# Patient Record
Sex: MALE | Race: WHITE | URBAN - METROPOLITAN AREA
[De-identification: names, ages, dates, MRNs, and addresses within clinical notes are randomized per-mention and may not be internally consistent; named-entity substitution may affect disease eponyms.]

---

## 2018-10-08 ENCOUNTER — OFFICE VISIT (OUTPATIENT)
Dept: ORTHOPEDIC SURGERY | Age: 23
End: 2018-10-08
Payer: COMMERCIAL

## 2018-10-08 VITALS
HEART RATE: 67 BPM | HEIGHT: 70 IN | WEIGHT: 175 LBS | DIASTOLIC BLOOD PRESSURE: 69 MMHG | SYSTOLIC BLOOD PRESSURE: 111 MMHG | BODY MASS INDEX: 25.05 KG/M2

## 2018-10-08 DIAGNOSIS — M25.852 FEMOROACETABULAR IMPINGEMENT OF LEFT HIP: ICD-10-CM

## 2018-10-08 DIAGNOSIS — M25.551 BILATERAL HIP PAIN: Primary | ICD-10-CM

## 2018-10-08 DIAGNOSIS — M25.851 FEMOROACETABULAR IMPINGEMENT OF RIGHT HIP: ICD-10-CM

## 2018-10-08 DIAGNOSIS — M25.552 BILATERAL HIP PAIN: Primary | ICD-10-CM

## 2018-10-08 PROCEDURE — G8419 CALC BMI OUT NRM PARAM NOF/U: HCPCS | Performed by: ORTHOPAEDIC SURGERY

## 2018-10-08 PROCEDURE — G8484 FLU IMMUNIZE NO ADMIN: HCPCS | Performed by: ORTHOPAEDIC SURGERY

## 2018-10-08 PROCEDURE — G8427 DOCREV CUR MEDS BY ELIG CLIN: HCPCS | Performed by: ORTHOPAEDIC SURGERY

## 2018-10-08 PROCEDURE — 99243 OFF/OP CNSLTJ NEW/EST LOW 30: CPT | Performed by: ORTHOPAEDIC SURGERY

## 2018-10-08 NOTE — PROGRESS NOTES
intact. [] Edema:  [x] none  [] mild  [] moderate  [] severe         RIGHT HIP ORTHOPAEDIC  EXAM:  Inspection:  [x] Skin intact without abrasion, lacerations or rashes  [x] Leg lengths equal  [] Ecchymosis:  [x] none  [] mild  [] moderate  [] severe   [] Atrophy:  [x] none  [] mild  [] moderate  [] severe      Range of Motion:  [x] No flexion contracture         [] Deferred: acute injury/post-surgery/pain  [] Flexion contracture     Forward flexion: 110  Supine Internal rotation: 20 positive pain  Supine External rotation: 65  Abduction: 50  Adduction: 30      Palpation:   Nontender    Provocative Tests:  [] Negative  Positive Tests:  [] Log Roll   [] Kamryn Test: ITB Tightness   [x] FADIR Anterior impingement:    [] Posterior Impingement    [] Shuck test for insufficient suction seal   [] Dial test for capsular insufficiency:    [] Resisted adduction for athletic pubalgia   [] Resisted curl up for athletic pubalgia     Motor Function:  [x] No gross motor weakness of hip [x] No gross motor weakness of knee  [x] No gross motor weakness of ankle    [x] No gross motor weakness of great toe    [] Motor strength:   [x] Hip Flex [x] 5/5 [] 4/5 [] 3/5 [] 2/5 [] 1/5 [] 0/5   [x] Hip ABductors [x] 5/5 [] 4/5 [] 3/5 [] 2/5 [] 1/5 [] 0/5   [x] Hip ADductors [x] 5/5 [] 4/5 [] 3/5 [] 2/5 [] 1/5 [] 0/5     Neurologic:  [x] Sensation to light touch intact  [x] Coordination / proprioception intact    Circulation:  [x] The limb is warm and well perfused. [x] Capillary refill is intact. 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 No fractures of any kind or any osteoblastic lesions. Joint space is reasonably well preserved.       X Ray Measure  Right Hip Left Hip    Center Edge Angle (CEA) 42° 45°    Alpha Angle (degrees for CAM) 71°  77°    Tonnis Grade (0-4) 0 0   Joint Space (mm) 4.2 mm  3.9 mm       Other Imaging: An MRI of the left hip was reviewed in the office. This MRI demonstrates the patient does have femoral acetabular impingement as well as a osseous stress reaction on the anterior rim of the acetabulum. Labrum was not very well visualized. 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 This is a condition in which there is an incongruency between the osseous structures surrounding the hip and can lead to persistent pain, labral injury and cartilage injury in some cases. Pain is anterior and has been present for several years off and on. Thus far the following treatments have been tried: Several years of activity modification as well as some strengthening exercises. Diagnosis:    Diagnosis Orders   1. Bilateral hip pain  XR HIP BILATERAL W AP PELVIS (2 VIEWS)   2. Femoroacetabular impingement of left hip     3. Femoroacetabular impingement of right hip           Plan:     I discussed the diagnosis and the treatment options with Larissa Parkinson today. I did provide him with a lot of education materials on his current condition. We are going to try some targeted physical therapy which she currently is already involved with. If he does not improve I do think you be a good candidate for a hip arthroscopy given the large size of his cam lesion. Patient is on board with this. If he does have surgery he likely would consider having this sometime when it asked fifths in with his school schedule. Return to Clinic/Follow - Up:  6-8 weeks    Larissa Parkinson was instructed to call the office if his symptoms worsen or if new symptoms appear prior to the next scheduled visit.  He is

## 2018-10-16 ENCOUNTER — TELEPHONE (OUTPATIENT)
Dept: ORTHOPEDIC SURGERY | Age: 23
End: 2018-10-16